# Patient Record
Sex: FEMALE | Race: WHITE | Employment: OTHER | URBAN - METROPOLITAN AREA
[De-identification: names, ages, dates, MRNs, and addresses within clinical notes are randomized per-mention and may not be internally consistent; named-entity substitution may affect disease eponyms.]

---

## 2017-03-13 ENCOUNTER — HOSPITAL ENCOUNTER (OUTPATIENT)
Dept: CT IMAGING | Age: 67
Discharge: HOME | End: 2017-03-13
Attending: FAMILY MEDICINE
Payer: COMMERCIAL

## 2017-03-13 ENCOUNTER — HOSPITAL ENCOUNTER (OUTPATIENT)
Dept: GENERAL RADIOLOGY | Age: 67
Discharge: HOME | End: 2017-03-13
Payer: COMMERCIAL

## 2017-03-13 DIAGNOSIS — Z13.9 SCREENING: ICD-10-CM

## 2017-03-13 DIAGNOSIS — R52 PAIN: ICD-10-CM

## 2017-03-13 PROCEDURE — 73502 X-RAY EXAM HIP UNI 2-3 VIEWS: CPT

## 2017-03-13 PROCEDURE — 75571 CT HRT W/O DYE W/CA TEST: CPT

## 2017-03-14 NOTE — CARDIO/PULMONARY
Cardiac Rehab: Attempted to contact pt about calcium scoring results. Home telephone number had answering machine identifying phone for \"[employer]. \" Called pt's son and inquired about reaching patient on another number. He declined to give this nurse pt's contact #. Requested son let pt know report on CT scan was being mailed to her and that if she registered for My Chart, she could look up results online. Sent copy of report to pt. UPDATE at 1750: Pt called back and Calcium score (0) was reviewed with her. Discussed significance of results, and CAD risk factors. Pt reported that she was being screened because her PCP recommended it. Pt is on med for HTN. Has high cholesterol that is being \"watched. \" Denied DM and smoking. Encouraged heart healthy/low sodium diet and regular exercise. Pt indicated she would continue to f/u with PCP.